# Patient Record
Sex: MALE | Race: ASIAN | URBAN - METROPOLITAN AREA
[De-identification: names, ages, dates, MRNs, and addresses within clinical notes are randomized per-mention and may not be internally consistent; named-entity substitution may affect disease eponyms.]

---
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
## 2023-02-01 ENCOUNTER — OFFICE VISIT (OUTPATIENT)
Dept: FAMILY MEDICINE CLINIC | Facility: CLINIC | Age: 33
End: 2023-02-01
Payer: COMMERCIAL

## 2023-02-01 VITALS
HEIGHT: 71.7 IN | DIASTOLIC BLOOD PRESSURE: 80 MMHG | OXYGEN SATURATION: 99 % | TEMPERATURE: 98 F | SYSTOLIC BLOOD PRESSURE: 126 MMHG | BODY MASS INDEX: 33.82 KG/M2 | HEART RATE: 82 BPM | WEIGHT: 247 LBS | RESPIRATION RATE: 18 BRPM

## 2023-02-01 DIAGNOSIS — N50.812 LEFT TESTICULAR PAIN: ICD-10-CM

## 2023-02-01 DIAGNOSIS — R22.31 LUMP OF SKIN OF RIGHT UPPER EXTREMITY: ICD-10-CM

## 2023-02-01 DIAGNOSIS — R19.02 LEFT UPPER QUADRANT ABDOMINAL MASS: ICD-10-CM

## 2023-02-01 DIAGNOSIS — E55.9 VITAMIN D DEFICIENCY: ICD-10-CM

## 2023-02-01 DIAGNOSIS — R31.9 HEMATURIA, UNSPECIFIED TYPE: ICD-10-CM

## 2023-02-01 DIAGNOSIS — Z13.6 SCREENING FOR CARDIOVASCULAR CONDITION: ICD-10-CM

## 2023-02-01 DIAGNOSIS — Z00.00 ANNUAL PHYSICAL EXAM: Primary | ICD-10-CM

## 2023-02-01 PROCEDURE — 3079F DIAST BP 80-89 MM HG: CPT | Performed by: FAMILY MEDICINE

## 2023-02-01 PROCEDURE — 99385 PREV VISIT NEW AGE 18-39: CPT | Performed by: FAMILY MEDICINE

## 2023-02-01 PROCEDURE — 3074F SYST BP LT 130 MM HG: CPT | Performed by: FAMILY MEDICINE

## 2023-02-01 PROCEDURE — 3008F BODY MASS INDEX DOCD: CPT | Performed by: FAMILY MEDICINE

## 2023-02-03 LAB
ALBUMIN/GLOBULIN RATIO: 1.6 (CALC) (ref 1–2.5)
ALBUMIN: 4.5 G/DL (ref 3.6–5.1)
ALKALINE PHOSPHATASE: 68 U/L (ref 36–130)
ALT: 36 U/L (ref 9–46)
APPEARANCE: CLEAR
AST: 28 U/L (ref 10–40)
BILIRUBIN, TOTAL: 0.4 MG/DL (ref 0.2–1.2)
BILIRUBIN: NEGATIVE
BUN: 18 MG/DL (ref 7–25)
CALCIUM: 9.8 MG/DL (ref 8.6–10.3)
CARBON DIOXIDE: 28 MMOL/L (ref 20–32)
CHLORIDE: 104 MMOL/L (ref 98–110)
CHOL/HDLC RATIO: 5 (CALC)
CHOLESTEROL, TOTAL: 198 MG/DL
COLOR: YELLOW
CREATININE: 0.89 MG/DL (ref 0.6–1.26)
EGFR: 117 ML/MIN/1.73M2
GLOBULIN: 2.8 G/DL (CALC) (ref 1.9–3.7)
GLUCOSE: 100 MG/DL (ref 65–99)
GLUCOSE: NEGATIVE
HDL CHOLESTEROL: 40 MG/DL
HEMATOCRIT: 42.8 % (ref 38.5–50)
HEMOGLOBIN: 15 G/DL (ref 13.2–17.1)
KETONES: NEGATIVE
LDL-CHOLESTEROL: 130 MG/DL (CALC)
LEUKOCYTE ESTERASE: NEGATIVE
MCH: 27.8 PG (ref 27–33)
MCHC: 35 G/DL (ref 32–36)
MCV: 79.3 FL (ref 80–100)
MPV: 12.1 FL (ref 7.5–12.5)
NITRITE: NEGATIVE
NON-HDL CHOLESTEROL: 158 MG/DL (CALC)
OCCULT BLOOD: NEGATIVE
PH: 6 (ref 5–8)
PLATELET COUNT: 232 THOUSAND/UL (ref 140–400)
POTASSIUM: 4.8 MMOL/L (ref 3.5–5.3)
PROTEIN, TOTAL: 7.3 G/DL (ref 6.1–8.1)
PROTEIN: NEGATIVE
RDW: 13.6 % (ref 11–15)
RED BLOOD CELL COUNT: 5.4 MILLION/UL (ref 4.2–5.8)
SODIUM: 139 MMOL/L (ref 135–146)
SPECIFIC GRAVITY: 1.02 (ref 1–1.03)
T4, FREE: 1.1 NG/DL (ref 0.8–1.8)
TRIGLYCERIDES: 163 MG/DL
TSH W/REFLEX TO FT4: 6.03 MIU/L (ref 0.4–4.5)
VITAMIN D, 25-OH, TOTAL: 24 NG/ML (ref 30–100)
WHITE BLOOD CELL COUNT: 6.8 THOUSAND/UL (ref 3.8–10.8)

## 2023-02-06 ENCOUNTER — TELEPHONE (OUTPATIENT)
Dept: FAMILY MEDICINE CLINIC | Facility: CLINIC | Age: 33
End: 2023-02-06

## 2023-02-06 DIAGNOSIS — E55.9 VITAMIN D DEFICIENCY: Primary | ICD-10-CM

## 2023-02-06 DIAGNOSIS — R79.89 HIGH SERUM THYROID STIMULATING HORMONE (TSH): ICD-10-CM

## 2023-02-06 RX ORDER — ERGOCALCIFEROL 1.25 MG/1
50000 CAPSULE ORAL WEEKLY
Qty: 12 CAPSULE | Refills: 0 | Status: SHIPPED | OUTPATIENT
Start: 2023-02-06 | End: 2023-04-25

## 2023-02-06 NOTE — TELEPHONE ENCOUNTER
----- Message from Sybil Rogers MD sent at 2/6/2023  5:05 PM CST -----  Results reviewed. Please inform patient labs overall looks good. His TSH mild elevated but T4 is normal.  We will repeat a TSH in 4 weeks if it still elevated then consider starting medication for thyroid.   Vitamin D is low I did order medication for 3 months and then he can take over-the-counter vitamin D 5000 international units every day high cholesterol  Recommend low-fat diet and exercise  Urine test is normal.

## 2023-02-09 ENCOUNTER — HOSPITAL ENCOUNTER (OUTPATIENT)
Dept: ULTRASOUND IMAGING | Age: 33
Discharge: HOME | End: 2023-02-09
Attending: FAMILY MEDICINE
Payer: COMMERCIAL

## 2023-02-09 DIAGNOSIS — N50.812 LEFT TESTICULAR PAIN: ICD-10-CM

## 2023-02-09 PROCEDURE — 76870 US EXAM SCROTUM: CPT | Performed by: FAMILY MEDICINE

## 2023-02-09 PROCEDURE — 93975 VASCULAR STUDY: CPT | Performed by: FAMILY MEDICINE

## 2023-02-14 ENCOUNTER — TELEPHONE (OUTPATIENT)
Dept: FAMILY MEDICINE CLINIC | Facility: CLINIC | Age: 33
End: 2023-02-14

## 2023-02-14 NOTE — TELEPHONE ENCOUNTER
----- Message from Junior Esperanza MD sent at 2/14/2023 12:09 PM CST -----  Results reviewed. Please inform patient Us testicular area shows no mass or any abnormality.   If the symptoms continues call office and we will have him see urologist.

## 2023-03-06 ENCOUNTER — TELEPHONE (OUTPATIENT)
Dept: FAMILY MEDICINE CLINIC | Facility: CLINIC | Age: 33
End: 2023-03-06

## 2023-03-09 ENCOUNTER — TELEPHONE (OUTPATIENT)
Dept: FAMILY MEDICINE CLINIC | Facility: CLINIC | Age: 33
End: 2023-03-09

## 2023-03-26 ENCOUNTER — HOSPITAL ENCOUNTER (OUTPATIENT)
Age: 33
Discharge: HOME | End: 2023-03-26
Payer: COMMERCIAL

## 2023-03-26 VITALS
RESPIRATION RATE: 18 BRPM | TEMPERATURE: 98 F | SYSTOLIC BLOOD PRESSURE: 151 MMHG | DIASTOLIC BLOOD PRESSURE: 104 MMHG | OXYGEN SATURATION: 98 % | HEART RATE: 94 BPM

## 2023-03-26 DIAGNOSIS — J02.0 STREPTOCOCCAL SORE THROAT: Primary | ICD-10-CM

## 2023-03-26 LAB — S PYO AG THROAT QL: POSITIVE

## 2023-03-26 PROCEDURE — 87880 STREP A ASSAY W/OPTIC: CPT | Performed by: NURSE PRACTITIONER

## 2023-03-26 PROCEDURE — 99203 OFFICE O/P NEW LOW 30 MIN: CPT | Performed by: NURSE PRACTITIONER

## 2023-03-26 RX ORDER — DEXAMETHASONE SODIUM PHOSPHATE 4 MG/ML
12 INJECTION, SOLUTION INTRA-ARTICULAR; INTRALESIONAL; INTRAMUSCULAR; INTRAVENOUS; SOFT TISSUE ONCE
Status: COMPLETED | OUTPATIENT
Start: 2023-03-26 | End: 2023-03-26

## 2023-03-26 RX ORDER — PENICILLIN V POTASSIUM 500 MG/1
500 TABLET ORAL 3 TIMES DAILY
Qty: 30 TABLET | Refills: 0 | Status: SHIPPED | OUTPATIENT
Start: 2023-03-26 | End: 2023-04-05

## 2023-04-14 ENCOUNTER — PATIENT MESSAGE (OUTPATIENT)
Dept: FAMILY MEDICINE CLINIC | Facility: CLINIC | Age: 33
End: 2023-04-14

## 2023-04-14 DIAGNOSIS — E55.9 VITAMIN D DEFICIENCY: ICD-10-CM

## 2023-04-14 RX ORDER — ERGOCALCIFEROL 1.25 MG/1
50000 CAPSULE ORAL WEEKLY
Qty: 12 CAPSULE | Refills: 0 | OUTPATIENT
Start: 2023-04-14 | End: 2023-07-01

## 2023-04-14 NOTE — TELEPHONE ENCOUNTER
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 Lucretia Upton:    [facility] you are doing well. I have a question for you. So I'd be attending the [address] [place] [address] starting this fall and they have some immunization records. I know for sure that I have had these immunizations in the past (except the TDap in the past 10 years) but I am not able to find them. I will still look for them but meanwhile, I wanted to ask you:    1. Is it possible for me to still get these immunizations between now and say, August when the classes start? 2. They offer an alternative for certificate of immunity - I don't know what that means but is that something I can have from your office? Meanwhile, I will look for the old vaccination records and see if I can find them.      Thanks,    Deanne Layne

## 2023-05-15 ENCOUNTER — NURSE ONLY (OUTPATIENT)
Dept: FAMILY MEDICINE CLINIC | Facility: CLINIC | Age: 33
End: 2023-05-15
Payer: COMMERCIAL

## 2023-05-15 DIAGNOSIS — Z23 NEED FOR VACCINATION: Primary | ICD-10-CM

## 2023-05-15 DIAGNOSIS — Z02.9 ADMINISTRATIVE ENCOUNTER: ICD-10-CM

## 2023-05-15 PROCEDURE — 90471 IMMUNIZATION ADMIN: CPT | Performed by: FAMILY MEDICINE

## 2023-05-15 PROCEDURE — 90715 TDAP VACCINE 7 YRS/> IM: CPT | Performed by: FAMILY MEDICINE

## 2023-05-16 ENCOUNTER — PATIENT MESSAGE (OUTPATIENT)
Dept: FAMILY MEDICINE CLINIC | Facility: CLINIC | Age: 33
End: 2023-05-16

## 2023-07-20 ENCOUNTER — PATIENT MESSAGE (OUTPATIENT)
Dept: FAMILY MEDICINE CLINIC | Facility: CLINIC | Age: 33
End: 2023-07-20

## 2023-07-21 NOTE — TELEPHONE ENCOUNTER
From: Kike Garcias  To: Kelly Shukla MD  Sent: 7/20/2023 7:07 PM CDT  Subject: Immunization Form for Bridgette Celis Dr. Rashid Alicia you are doing well. 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 One of the requirements for the incoming students is to fill out the immunization form (attached here). I have put my details in the form. Would you be able to see if someone at your office can help me fill the dates for the vaccinations and stamp/sign as necessary as per my record? Lastly, one of the requirements is also to do a Quantiferon TB Blood Test and include the result in the form. I did this a few days ago before leaving [facility] and the report is attached here. This report is also available here on the provider's site: [email or web address]     If I need to submit in person, please let me know and I can come and drop this form off at your office. Thank you so much and hope you are doing well.     Sincerely,    Jerel

## 2023-07-22 ENCOUNTER — PATIENT MESSAGE (OUTPATIENT)
Dept: FAMILY MEDICINE CLINIC | Facility: CLINIC | Age: 33
End: 2023-07-22

## 2023-07-22 DIAGNOSIS — N50.812 LEFT TESTICULAR PAIN: Primary | ICD-10-CM

## 2023-07-22 NOTE — TELEPHONE ENCOUNTER
From: Aston Leslie  To: Jean-Paul Lloyd MD  Sent: 7/22/2023 11:18 AM CDT  Subject: Two Questions    Hi Dr. Binh Poon you are doing well. So I wanted to ask two questions. 1. I realized that I did not do my repeat thyroid tests in March. Would you be able to send a request to [address] [place] [address]) so I can get the tests done? 2. I still have occasional pain in the testes (on the left side). However, what I have noticed these days that there is accompanying pain on the penis as well (left side). Do I need to do more tests or see a specialist? Or should I just wait and see if the situation improves?     Thanks,    [place] Cease

## 2023-08-25 ENCOUNTER — OFFICE VISIT (OUTPATIENT)
Dept: SURGERY | Facility: CLINIC | Age: 33
End: 2023-08-25

## 2023-08-25 DIAGNOSIS — N45.1 EPIDIDYMITIS: Primary | ICD-10-CM

## 2023-08-25 DIAGNOSIS — R82.90 URINE FINDING: ICD-10-CM

## 2023-08-25 LAB
APPEARANCE: CLEAR
BILIRUBIN: NEGATIVE
GLUCOSE (URINE DIPSTICK): NEGATIVE MG/DL
KETONES (URINE DIPSTICK): NEGATIVE MG/DL
LEUKOCYTES: NEGATIVE
MULTISTIX LOT#: NORMAL NUMERIC
NITRITE, URINE: NEGATIVE
OCCULT BLOOD: NEGATIVE
PH, URINE: 5.5 (ref 4.5–8)
PROTEIN (URINE DIPSTICK): NEGATIVE MG/DL
SPECIFIC GRAVITY: 1.02 (ref 1–1.03)
URINE-COLOR: YELLOW
UROBILINOGEN,SEMI-QN: 0.2 MG/DL (ref 0–1.9)

## 2023-08-25 PROCEDURE — 99203 OFFICE O/P NEW LOW 30 MIN: CPT | Performed by: PHYSICIAN ASSISTANT

## 2023-08-25 PROCEDURE — 81002 URINALYSIS NONAUTO W/O SCOPE: CPT | Performed by: PHYSICIAN ASSISTANT

## 2023-08-25 NOTE — PATIENT INSTRUCTIONS
Epididymitis and Testicular Pain  The epididymis is a small tube next to the testicle that stores sperm. Inflammation of the epididymis can cause pain and swelling in your scrotum. The condition may be acute (sudden onset) or chronic (persisting for a longer period of time or recurrent). - Acute epididymitis is typically characterized by sudden onset pain, tenderness, swelling and redness.  - Chronic epididymitis is typically characterized by intermittent or long-term dull ache in one or both testicles but the pain can become severe at times. Causes  - Acute epididymitis is often caused by an infection. In sexually active men, it is often caused by a sexually transmitted disease (STD) such as chlamydia or gonorrhea. In boys and in men over 36, it can be from bacteria from other parts of the urinary tract (not an STD infection). It may also be caused by trauma. - Chronic epididymitis often may not be associated with an infectious process. Things that may irritate the testicles include sitting for long periods of time, squats or dead-lifts, motorcycles or biking, inflammatory disorders of the pelvis and other painful conditions that can affect the pelvis such as chronic low back pain. Constipation and other bowel-related issues can contribute to chronic testicular pain. Symptoms may begin with pain in the lower belly (abdomen) or low back. The pain then spreads down into the scrotum. Usually only one side is affected. The testicle and scrotum swell and become very painful and red. You may have fever and a burning when passing urine. Sometimes you may have a discharge from the penis. Treatment is typically with antibiotics, and anti-inflammatory and pain medicines. The condition should get better over the first few days of treatment. But it will take several weeks for all the swelling and discomfort to go away.  If your healthcare provider suspects that an STD is the cause, your sexual partners may need to be treated. Home care  The following will help you care for yourself at home:  Support the scrotum. When lying down, place a rolled towel under the scrotum. When walking, use an athletic supporter or 2 pairs of jockey-style underwear. Rest at home until the fever is gone and you are feeling better. If your healthcare gives you an antibiotic, take it exactly as you are told. Take it until it is all gone. Avoid sitting at a 90 degree angle for long periods of time. Standing or reclining is preferable. Avoid sitting on anything that shakes (tractors, lawn-mowers, long car rides) if possible. You may use a donut while sitting to avoid excessive pressure on the testicles while sitting. To relieve pain, put ice packs on the inflamed area. You can make your own ice pack by putting ice cubes in a sealed plastic bag wrapped in a thin towel. Soaking in a hot bath or using a heating pad may help alleviated discomfort in men suffering from chronic epididymitis. Would avoid excessive heat in the setting of acute epididymitis. You may use over-the-counter medicines to control pain, unless another medicine was given. Ibuprofen is typically a very effective anti-inflammatory pain medication. You may take 400 mg twice daily with plenty of water as needed for discomfort. If you have chronic liver or kidney disease, talk with your healthcare provider before taking these medicines. Also talk with your provider if you've ever had a stomach ulcer or GI bleeding. Make sure chronic pain issues are under good control, especially back pain issues as this is a significant risk factor for chronic testicular/pelvic pain. Talk to your primary care or back specialist if your pain is not under adequate control. Constipation can make you strain. This makes the pain worse. Avoid constipation by eating natural laxatives such as prunes, fresh fruits, and whole-grain cereals.  If necessary, use a mild over-the-counter laxative such as colace for constipation. Mineral oil can be used to keep the stools soft.

## 2023-08-25 NOTE — PROGRESS NOTES
[facility], [address] [facility]    Urology Consult Note    History of Present Illness:   Patient is a 33year old healthy male who presents today for consultation from Dr. Brannon Ramirez office for intermittent left testicular pain. He has been seen by his PCP for this back in February 2023 for same pain that has been present for >1 year. Ultrasound was completed in February and normal, without evidence of any acute abnormality. Onset: >1 year, Laterality: left, Severity: low, takes ibuprofen prn. Exacerbating/Alleviating factors: none in particular, but upon further conversation has had some increased after long trips that involved sitting etc.   Pulsating intermittent sharp pain that can last for 12-24 hours - able to go about normal activities. Seems that he has been noticing on the left side of his penis. Occupation:  and recently and sits for prolonged hours during the day. He denies any use biking or heavy  Stress: high stress with balancing everything, has been ongoing for >2 years. Back pain: not currently, but has historically had low back pain. He denies f/c, n/v, abd/flank pain. Sleeps from 2-6am    No significant voiding complaints. No incontinence. No dysuria, gross hematuria. UA is negative. No concern or hx of STI. Tobacco hx: none  Kidney stone hx: none  Fam h/o  malignancy: none    Had been told that when he was seen in [place] for [place] pain. UA showed trace blood, culture negative. HISTORY:  No past medical history on file. No past surgical history on file. No family history on file.    Social History:   Social History     Socioeconomic History    Marital status:    Tobacco Use    Smoking status: Never    Smokeless tobacco: Never    Tobacco comments:     NEVER    Vaping Use    Vaping Use: Never used   Substance and Sexual Activity    Alcohol use: Not Currently    Drug use: Never        Allergies  No Known Allergies    Review of Systems:   A 10-point review of systems was completed and is negative other than as noted above. Physical Exam:   There were no vitals taken for this visit. GENERAL APPEARANCE: well developed, well nourished, in no acute distress  NEUROLOGIC: no localizing neurologic signs, alert and oriented x 3, converses appropriately  HEAD: atraumatic, normocephalic  EYES: sclera non-icteric  ORAL CAVITY: mucosa moist  NECK/THYROID: no obvious masses or goiter  LUNGS: non-labored breathing  ABDOMEN: soft, nontender, nondistended  CVA: no CVA tenderness  INGUINAL CANALS: no hernias  PENILE MEATUS: open and in normal location  PENIS normal  SCROTUM: normal  no varicocele  TESTES: normal anatomy left testicle high riding within scrotum but able to be pulled easily into inferior scrotum  EPIDIDYMIS: normal anatomy  EXTREMITIES: warm, well-perfused. No clubbing, cyanosis or edema. SKIN: no obvious rashes    Results:     Laboratory Data:  Lab Results   Component Value Date    WBC 6.8 02/02/2023    HGB 15.0 02/02/2023     02/02/2023     Lab Results   Component Value Date     02/02/2023    K 4.8 02/02/2023     02/02/2023    CO2 28 02/02/2023    BUN 18 02/02/2023     (H) 02/02/2023    AST 28 02/02/2023    ALT 36 02/02/2023    TP 7.3 02/02/2023    ALB 4.5 02/02/2023    CA 9.8 02/02/2023       Urinalysis Results (last three years):  Recent Labs     02/02/23  0919 08/25/23  1149   COLORUR YELLOW  --    CLARITY CLEAR  --    SPECGRAVITY 1.021 1.025   PHURINE 6.0 5.5   PROUR NEGATIVE  --    GLUUR NEGATIVE  --    KETUR NEGATIVE  --    BILUR NEGATIVE  --    NITRITE NEGATIVE Negative   LEUUR NEGATIVE  --    WBCUR NONE SEEN  --    RBCUR NONE SEEN  --    BACUR NONE SEEN  --        Urine Culture Results (last three years):  No results found for: URINECUL    Imaging  No results found.       Impression:     Patient is a 33year old male who presents today [facility]. We discussed that he likely has a component of epididymitis. We reviewed epididymitis and testicular pain treatment and prevention strategies and I provided and reviewed educational materials for this. I recommend he drink plenty of fluids and try prn NSAIDs, wear an athletic supporter and try to avoid activities which exacerbate pain such as prolonged sitting or heavy lifting, try hot baths/hot packs. We also discussed PFT in the future if ongoing symptoms. Recommendations:  As above. Thank you very much for this consult. Please call if there are any questions or concerns.      Riddhi Wilkerson PA-C  Urology  [facility]    Date: 8/25/2023

## 2023-12-13 ENCOUNTER — PATIENT MESSAGE (OUTPATIENT)
Dept: FAMILY MEDICINE CLINIC | Facility: CLINIC | Age: 33
End: 2023-12-13

## 2023-12-14 NOTE — TELEPHONE ENCOUNTER
From: Haider Stuart  To: Cristela Sosa  Sent: 12/13/2023 8:45 PM CST  Subject: Updating Ivy Estrada Grandchild,    I wanted to update you about our new health insurance information. If you can pass this along to the relevant person to update the health insurance in the system that'd great. This is only for me and my wife Cherelle Hernandez; she is the dependent on the same insurance).     Best,    Ellie Garcias

## 2023-12-21 ENCOUNTER — PATIENT MESSAGE (OUTPATIENT)
Dept: FAMILY MEDICINE CLINIC | Facility: CLINIC | Age: 33
End: 2023-12-21

## 2023-12-21 NOTE — TELEPHONE ENCOUNTER
From: Rosi Alexandre  To: Gi[facility]pe [facility]  Sent: 12/21/2023 2:12 AM CST  Subject: Muscle Pain    Hi Dr. Asha Rodriguez,    So for the past few months, I have been going to gym. I do some strength training every day. For the last three weeks or so, I have been experiencing a sharp pain when I am walking or running in the front part of the leg muscles (above the knee). I was looking at the picture of the leg muscles and it seems like the pain is probably around the tensor fasciae latae or rectus femoris area. I have been managing well enough to walk but if I sit for an extended period of time or try running at a higher speed, I feel the sharp pain in the leg muscle. Because it has been three weeks and the pain hasn't subsided, I am writing to you. I don't exactly recall what triggered this but I do remember that I was doing some heavy backyard work (moving wheelbarrows of leaves and debris) and then I may have also done heavy leg exercises that week. Should I just wait and watch, and see if the situation improves in a week? Out of a scale of 1-10 for pain, it's probably 0-1 when walking but it escalates to   3-4 for light running and 7-8 for faster running.      Thanks,    Elizabeth Null

## 2023-12-21 NOTE — TELEPHONE ENCOUNTER
Recommend to take ibuprofen 400mg 3 times a day for 3 days. Stop exercise for few days and see if it improves. Also if any redness or swelling tingling numbness then do recommend to go to ER to make sure its not blood clot.

## 2024-03-28 ENCOUNTER — PATIENT MESSAGE (OUTPATIENT)
Dept: FAMILY MEDICINE CLINIC | Facility: CLINIC | Age: 34
End: 2024-03-28

## 2024-03-28 DIAGNOSIS — N50.89 TESTICULAR LUMP: Primary | ICD-10-CM

## 2024-03-29 NOTE — TELEPHONE ENCOUNTER
Referral on file for Dr. Ellis, but has .  New referral placed.  Contact information sent via [employer]

## 2024-03-29 NOTE — TELEPHONE ENCOUNTER
From: Pascual Field  To: Alanis Garduno  Sent: 3/28/2024 11:18 PM CDT  Subject: Lump on the left testes?    Hi Dr. Garduno,    A few weeks ago I felt that I have a small lump on my left testes. Today I felt it again. It's painless and it doesn't bother. It's probably the size of a small almond. It's a little hard to find at first but it's there on the left side of the left testes. Should I be watching for some other signs/symptoms or do I need to probably go for ultrasound?     Horacio,    Jerel

## 2024-09-11 ENCOUNTER — PATIENT MESSAGE (OUTPATIENT)
Dept: FAMILY MEDICINE CLINIC | Facility: CLINIC | Age: 34
End: 2024-09-11

## 2024-09-12 NOTE — TELEPHONE ENCOUNTER
Patient has an upcoming appointment scheduled.  Patient is due.  Okay to proceed with immunization paperwork?    Future Appointments   Date Time Provider Department Center   11/7/2024  9:00 AM [facility]

## 2024-09-12 NOTE — TELEPHONE ENCOUNTER
From: Pascual Field  To: Alanis Garduno  Sent: 9/11/2024 7:42 PM CDT  Subject: Immunization Record    Hi Dr. 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 I have now transferred to [employer], and they have similar requirements. Would you be able to please help me fill out this form attached here?    If you have any questions, please let me know. I or my wife can pick it up from the office over the next two days or early next week.     Thank you,    Jerel

## 2024-11-07 ENCOUNTER — PATIENT MESSAGE (OUTPATIENT)
Dept: FAMILY MEDICINE CLINIC | Facility: CLINIC | Age: 34
End: 2024-11-07

## 2024-11-09 ENCOUNTER — OFFICE VISIT (OUTPATIENT)
Dept: FAMILY MEDICINE CLINIC | Facility: CLINIC | Age: 34
End: 2024-11-09
Payer: COMMERCIAL

## 2024-11-09 VITALS
RESPIRATION RATE: 18 BRPM | DIASTOLIC BLOOD PRESSURE: 84 MMHG | OXYGEN SATURATION: 99 % | WEIGHT: 250 LBS | HEIGHT: 72 IN | TEMPERATURE: 98 F | SYSTOLIC BLOOD PRESSURE: 128 MMHG | HEART RATE: 105 BPM | BODY MASS INDEX: 33.86 KG/M2

## 2024-11-09 DIAGNOSIS — Z23 NEED FOR VACCINATION: ICD-10-CM

## 2024-11-09 DIAGNOSIS — Z00.00 ANNUAL PHYSICAL EXAM: Primary | ICD-10-CM

## 2024-11-09 DIAGNOSIS — E55.9 VITAMIN D DEFICIENCY: ICD-10-CM

## 2024-11-09 DIAGNOSIS — Z13.6 SCREENING FOR CARDIOVASCULAR CONDITION: ICD-10-CM

## 2024-11-09 DIAGNOSIS — B36.0 PITYRIASIS VERSICOLOR: ICD-10-CM

## 2024-11-09 DIAGNOSIS — R79.89 HIGH SERUM THYROID STIMULATING HORMONE (TSH): ICD-10-CM

## 2024-11-09 LAB
ALBUMIN SERPL-MCNC: 4.7 G/DL (ref 3.2–4.8)
ALBUMIN/GLOB SERPL: 1.3 {RATIO} (ref 1–2)
ALP LIVER SERPL-CCNC: 74 U/L
ALT SERPL-CCNC: 34 U/L
ANION GAP SERPL CALC-SCNC: 4 MMOL/L (ref 0–18)
AST SERPL-CCNC: 27 U/L (ref ?–34)
BILIRUB SERPL-MCNC: 0.5 MG/DL (ref 0.3–1.2)
BUN BLD-MCNC: 20 MG/DL (ref 9–23)
CALCIUM BLD-MCNC: 10 MG/DL (ref 8.7–10.4)
CHLORIDE SERPL-SCNC: 108 MMOL/L (ref 98–112)
CHOLEST SERPL-MCNC: 212 MG/DL (ref ?–200)
CO2 SERPL-SCNC: 28 MMOL/L (ref 21–32)
CREAT BLD-MCNC: 0.99 MG/DL
EGFRCR SERPLBLD CKD-EPI 2021: 103 ML/MIN/1.73M2 (ref 60–?)
ERYTHROCYTE [DISTWIDTH] IN BLOOD BY AUTOMATED COUNT: 13.4 %
FASTING PATIENT LIPID ANSWER: YES
FASTING STATUS PATIENT QL REPORTED: YES
GLOBULIN PLAS-MCNC: 3.5 G/DL (ref 2–3.5)
GLUCOSE BLD-MCNC: 113 MG/DL (ref 70–99)
HCT VFR BLD AUTO: 45.5 %
HDLC SERPL-MCNC: 42 MG/DL (ref 40–59)
HGB BLD-MCNC: 16.6 G/DL
LDLC SERPL CALC-MCNC: 145 MG/DL (ref ?–100)
MCH RBC QN AUTO: 27.9 PG (ref 26–34)
MCHC RBC AUTO-ENTMCNC: 36.5 G/DL (ref 31–37)
MCV RBC AUTO: 76.6 FL
NONHDLC SERPL-MCNC: 170 MG/DL (ref ?–130)
OSMOLALITY SERPL CALC.SUM OF ELEC: 293 MOSM/KG (ref 275–295)
PLATELET # BLD AUTO: 302 10(3)UL (ref 150–450)
POTASSIUM SERPL-SCNC: 4.4 MMOL/L (ref 3.5–5.1)
PROT SERPL-MCNC: 8.2 G/DL (ref 5.7–8.2)
RBC # BLD AUTO: 5.94 X10(6)UL
SODIUM SERPL-SCNC: 140 MMOL/L (ref 136–145)
T4 FREE SERPL-MCNC: 1.3 NG/DL (ref 0.8–1.7)
TRIGL SERPL-MCNC: 139 MG/DL (ref 30–149)
TSI SER-ACNC: 6.14 UIU/ML (ref 0.55–4.78)
VIT D+METAB SERPL-MCNC: 25.4 NG/ML (ref 30–100)
VLDLC SERPL CALC-MCNC: 26 MG/DL (ref 0–30)
WBC # BLD AUTO: 9.2 X10(3) UL (ref 4–11)

## 2024-11-09 PROCEDURE — 84439 ASSAY OF FREE THYROXINE: CPT | Performed by: FAMILY MEDICINE

## 2024-11-09 PROCEDURE — 80053 COMPREHEN METABOLIC PANEL: CPT | Performed by: FAMILY MEDICINE

## 2024-11-09 PROCEDURE — 80061 LIPID PANEL: CPT | Performed by: FAMILY MEDICINE

## 2024-11-09 PROCEDURE — 90471 IMMUNIZATION ADMIN: CPT | Performed by: FAMILY MEDICINE

## 2024-11-09 PROCEDURE — 99395 PREV VISIT EST AGE 18-39: CPT | Performed by: FAMILY MEDICINE

## 2024-11-09 PROCEDURE — 85027 COMPLETE CBC AUTOMATED: CPT | Performed by: FAMILY MEDICINE

## 2024-11-09 PROCEDURE — 3008F BODY MASS INDEX DOCD: CPT | Performed by: FAMILY MEDICINE

## 2024-11-09 PROCEDURE — 3074F SYST BP LT 130 MM HG: CPT | Performed by: FAMILY MEDICINE

## 2024-11-09 PROCEDURE — 82306 VITAMIN D 25 HYDROXY: CPT | Performed by: FAMILY MEDICINE

## 2024-11-09 PROCEDURE — 84443 ASSAY THYROID STIM HORMONE: CPT | Performed by: FAMILY MEDICINE

## 2024-11-09 PROCEDURE — 3079F DIAST BP 80-89 MM HG: CPT | Performed by: FAMILY MEDICINE

## 2024-11-09 PROCEDURE — 90656 IIV3 VACC NO PRSV 0.5 ML IM: CPT | Performed by: FAMILY MEDICINE

## 2024-11-09 RX ORDER — CLOTRIMAZOLE AND BETAMETHASONE DIPROPIONATE 10; .64 MG/G; MG/G
1 CREAM TOPICAL 2 TIMES DAILY PRN
Qty: 45 G | Refills: 1 | Status: SHIPPED | OUTPATIENT
Start: 2024-11-09

## 2024-11-09 NOTE — PROGRESS NOTES
Pascual Field is a 34 year old male who presents for a complete physical exam.   HPI:   No concerns today.  Finger issue- symptoms started in July started with left thumb nad then few wks started in other finger. He has some cuts coming back. It sometimes bleed. He used vaseline and steroid and hydration. He is taking vitamin. He also notice some black spot on his abdomen.       Immunization History   Administered Date(s) Administered    Covid-19 Vaccine Pfizer 30 mcg/0.3 ml 05/05/2021, 11/02/2021    DTAP 11/06/1991, 12/11/1991, 01/15/1992, 04/16/1993    FLU VAC QIV SPLIT 3 YRS AND OLDER (39430) 10/09/2018    FLUZONE 6 months and older PFS 0.5 ml (79288) 03/23/2023    HEP B 09/26/1998, 10/26/1998, 11/26/1998    IPV 11/06/1991, 12/11/1991, 01/15/1992, 02/14/1992, 03/20/1992    Influenza 09/30/2020    Influenza Vaccine, trivalent (IIV3), PF 0.5mL (63983) 11/09/2024    MMR 06/17/1992, 05/08/1996, 08/14/2009    Meningococcal-Menactra 08/14/2009    TDAP 05/15/2023    Varicella Deferred (Had Chicken Pox) 05/20/2009     Wt Readings from Last 6 Encounters:   11/09/24 250 lb (113.4 kg)   02/01/23 247 lb (112 kg)     Body mass index is 33.91 kg/m².     Lab Results   Component Value Date     (H) 11/09/2024     (H) 02/02/2023     Lab Results   Component Value Date    CHOLEST 212 (H) 11/09/2024    CHOLEST 198 02/02/2023     Lab Results   Component Value Date    HDL 42 11/09/2024    HDL 40 02/02/2023     Lab Results   Component Value Date     (H) 11/09/2024     (H) 02/02/2023     Lab Results   Component Value Date    AST 27 11/09/2024    AST 28 02/02/2023     Lab Results   Component Value Date    ALT 34 11/09/2024    ALT 36 02/02/2023     No results found for: \"PSA\"     Current Outpatient Medications   Medication Sig Dispense Refill    clotrimazole-betamethasone 1-0.05 % External Cream Apply 1 Application topically 2 (two) times daily as needed. Apply over abdomen area and finger area 2 times a  day for 2 wks. 45 g 1      History reviewed. No pertinent past medical history.   History reviewed. No pertinent surgical history.   History reviewed. No pertinent family history.   Social History:  Social History     Socioeconomic History    Marital status:    Tobacco Use    Smoking status: Never    Smokeless tobacco: Never    Tobacco comments:     NEVER    Vaping Use    Vaping status: Never Used   Substance and Sexual Activity    Alcohol use: Not Currently    Drug use: Never        REVIEW OF SYSTEMS:   GENERAL: feels well   SKIN: denies any unusual skin lesions  EYES:denies blurred vision or double vision  HEENT: denies nasal congestion, sinus pain or ST  LUNGS: denies shortness of breath or cough with exertion  CARDIOVASCULAR: denies chest pain or palpitations  GI: denies abdominal pain. Denies heartburn. Has regular bowel movements. No blood in stool.  : denies nocturia or changes in stream. No hematuria.  MUSCULOSKELETAL: denies back pain  NEURO: denies headaches or dizziness  PSYCHE: denies depression or anxiety      EXAM:   /84   Pulse 105   Temp 97.5 °F (36.4 °C) (Temporal)   Resp 18   Ht 6' (1.829 m)   Wt 250 lb (113.4 kg)   SpO2 99%   BMI 33.91 kg/m²   Body mass index is 33.91 kg/m².   GENERAL: well nourished,in no apparent distress  SKIN: no rashes  HEENT: ears and throat are clear  EYES:PERRLA, EOMI, conjunctiva are clear  NECK: supple,no adenopathy,no bruits. No thyromegaly  BREAST: no dominant or suspicious mass  LUNGS: clear to auscultation  CARDIO: RRR without murmur  GI: soft, good BS's,no masses, HSM or tenderness  On his abdomen there are hyperpigmented brown patches  MUSCULOSKELETAL: back is not tender  Both finger and thumb dry crusty yellow skin. Very dry skin.   EXTREMITIES: no edema  NEURO: cranial nerves are intact,motor and sensory are grossly intact    ASSESSMENT AND PLAN:   Pascual Field is a 34 year old male who presents for a complete physical exam. Pt's  weight is Body mass index is 33.91 kg/m²., recommended low fat/carb diet and aerobic exercise 30-45 minutes 5 times weekly.   Fasting BW ordered: labs ordered  The patient indicates understanding of these issues and agrees to the plan.  Pityriasis versicolor on abdomen topical antifungal sent.  For finger can be irritated but also possible fungal component so cream sent. Derm referral given.   The patient is asked to return for CPX in 1 year.

## 2024-11-11 ENCOUNTER — PATIENT OUTREACH (OUTPATIENT)
Dept: FAMILY MEDICINE CLINIC | Facility: CLINIC | Age: 34
End: 2024-11-11

## 2024-11-21 ENCOUNTER — TELEMEDICINE (OUTPATIENT)
Dept: FAMILY MEDICINE CLINIC | Facility: CLINIC | Age: 34
End: 2024-11-21
Payer: COMMERCIAL

## 2024-11-21 DIAGNOSIS — R79.89 HIGH SERUM THYROID STIMULATING HORMONE (TSH): Primary | ICD-10-CM

## 2024-11-21 DIAGNOSIS — E78.49 OTHER HYPERLIPIDEMIA: ICD-10-CM

## 2024-11-21 NOTE — PROGRESS NOTES
No chief complaint on file.     Labs follow up  This visit is conducted using Telemedicine with live, interactive video and audio.    Patient has been referred to the [facility] website at [email or web address] to review the yearly Consent to Treat document.    Patient understands and accepts financial responsibility for any deductible, co-insurance and/or co-pays associated with this service.      HPI:    Patient ID: Pascual Field is a 34 year old male.    HPI Hyperlipidemia- no smoking hypertension or hyperlipidmia  F/h paternal gf heart disease.  High tsh he is using uniodised salt.     Review of Systems  Neg fatigue constipation hair loss.      Current Outpatient Medications   Medication Sig Dispense Refill    clotrimazole-betamethasone 1-0.05 % External Cream Apply 1 Application topically 2 (two) times daily as needed. Apply over abdomen area and finger area 2 times a day for 2 wks. 45 g 1     Allergies:Allergies[1]    HISTORY:  No past medical history on file.   No past surgical history on file.   No family history on file.   Social History:   Social History     Socioeconomic History    Marital status:    Tobacco Use    Smoking status: Never    Smokeless tobacco: Never    Tobacco comments:     NEVER    Vaping Use    Vaping status: Never Used   Substance and Sexual Activity    Alcohol use: Not Currently    Drug use: Never        PHYSICAL EXAM:    There were no vitals taken for this visit.   Physical Exam  Constitutional:       General: He is not in acute distress.     Appearance: He is not ill-appearing.   Skin:     Capillary Refill: Capillary refill takes less than 2 seconds.   Neurological:      Mental Status: He is alert.              ASSESSMENT/PLAN:   1. High serum thyroid stimulating hormone (TSH)  Repeat labs   - TSH W Reflex To Free T4; Future  - TSH W Reflex To Free T4    2. Other hyperlipidemia  Low fat diet and exercise.  Repeat labs  - Lipid Panel; Future  - Lipid Panel             No  follow-ups on file.          [1] No Known Allergies

## 2025-01-02 ENCOUNTER — TELEPHONE (OUTPATIENT)
Dept: FAMILY MEDICINE CLINIC | Facility: CLINIC | Age: 35
End: 2025-01-02

## 2025-01-02 DIAGNOSIS — B36.0 PITYRIASIS VERSICOLOR: Primary | ICD-10-CM

## 2025-01-02 NOTE — TELEPHONE ENCOUNTER
Patient is calling to see if we can change his referral for dermatology to [facility] in [place] to provider Durga Fraire.     He states that the referral that was given is no longer in network.   He has an appointment today with them at 1 pm. And they need a prior authorization in order to see him.     Fax number for the office is: [phone].    Patient requests a call back when it is sent.

## 2025-01-02 NOTE — TELEPHONE ENCOUNTER
Patient states this is a new provider that started in October 2024 so he may not be in the system yet  Patient with also be seeing Dr. Cheyanne Mariano  Referral placed and faxed   Statement Selected

## 2025-01-13 ENCOUNTER — PATIENT MESSAGE (OUTPATIENT)
Dept: FAMILY MEDICINE CLINIC | Facility: CLINIC | Age: 35
End: 2025-01-13

## 2025-01-13 DIAGNOSIS — B36.0 PITYRIASIS VERSICOLOR: Primary | ICD-10-CM

## 2025-01-23 ENCOUNTER — TELEPHONE (OUTPATIENT)
Dept: FAMILY MEDICINE CLINIC | Facility: CLINIC | Age: 35
End: 2025-01-23

## 2025-02-19 ENCOUNTER — APPOINTMENT (OUTPATIENT)
Dept: URBAN - METROPOLITAN AREA CLINIC 247 | Age: 35
Setting detail: DERMATOLOGY
End: 2025-02-19

## 2025-02-19 DIAGNOSIS — Q826 OTHER SPECIFIED ANOMALIES OF SKIN: ICD-10-CM

## 2025-02-19 DIAGNOSIS — L85.3 XEROSIS CUTIS: ICD-10-CM

## 2025-02-19 DIAGNOSIS — L28.0 LICHEN SIMPLEX CHRONICUS: ICD-10-CM

## 2025-02-19 DIAGNOSIS — Q819 OTHER SPECIFIED ANOMALIES OF SKIN: ICD-10-CM

## 2025-02-19 DIAGNOSIS — L20.89 OTHER ATOPIC DERMATITIS: ICD-10-CM

## 2025-02-19 DIAGNOSIS — Q828 OTHER SPECIFIED ANOMALIES OF SKIN: ICD-10-CM

## 2025-02-19 PROBLEM — L85.8 OTHER SPECIFIED EPIDERMAL THICKENING: Status: ACTIVE | Noted: 2025-02-19

## 2025-02-19 PROCEDURE — OTHER ADDITIONAL NOTES: OTHER

## 2025-02-19 PROCEDURE — OTHER OTC TREATMENT REGIMEN: OTHER

## 2025-02-19 PROCEDURE — OTHER PRESCRIPTION: OTHER

## 2025-02-19 PROCEDURE — 99204 OFFICE O/P NEW MOD 45 MIN: CPT

## 2025-02-19 PROCEDURE — OTHER MIPS QUALITY: OTHER

## 2025-02-19 PROCEDURE — OTHER COUNSELING: OTHER

## 2025-02-19 PROCEDURE — OTHER PRESCRIPTION MEDICATION MANAGEMENT: OTHER

## 2025-02-19 RX ORDER — CLOBETASOL PROPIONATE 0.5 MG/G
CREAM TOPICAL
Qty: 60 | Refills: 1 | Status: ERX | COMMUNITY
Start: 2025-02-19

## 2025-02-19 ASSESSMENT — LOCATION DETAILED DESCRIPTION DERM
LOCATION DETAILED: LEFT MIDDLE FINGERTIP
LOCATION DETAILED: LEFT MID DORSAL INDEX FINGER
LOCATION DETAILED: RIGHT RING FINGERNAIL
LOCATION DETAILED: RIGHT DISTAL DORSAL MIDDLE FINGER
LOCATION DETAILED: RIGHT MIDDLE FINGERNAIL
LOCATION DETAILED: RIGHT DISTAL DORSAL RING FINGER
LOCATION DETAILED: LEFT RING FINGERTIP
LOCATION DETAILED: LEFT INDEX FINGERNAIL
LOCATION DETAILED: LEFT MIDDLE FINGERNAIL
LOCATION DETAILED: LEFT POSTERIOR SHOULDER

## 2025-02-19 ASSESSMENT — LOCATION SIMPLE DESCRIPTION DERM
LOCATION SIMPLE: LEFT MIDDLE FINGER
LOCATION SIMPLE: RIGHT MIDDLE FINGER
LOCATION SIMPLE: RIGHT RING FINGER
LOCATION SIMPLE: LEFT RING FINGER
LOCATION SIMPLE: LEFT SHOULDER
LOCATION SIMPLE: LEFT INDEX FINGER

## 2025-02-19 ASSESSMENT — LOCATION ZONE DERM
LOCATION ZONE: ARM
LOCATION ZONE: FINGERNAIL
LOCATION ZONE: FINGER

## 2025-02-19 NOTE — PROCEDURE: OTC TREATMENT REGIMEN
Detail Level: Zone
Patient Specific Otc Recommendations (Will Not Stick From Patient To Patient): Eucerin roughness relief QPM

## 2025-02-19 NOTE — PROCEDURE: COUNSELING
Detail Level: Detailed
Patient Specific Counseling (Will Not Stick From Patient To Patient): Recommended OTC Eucerin roughness relief.
Patient Specific Counseling (Will Not Stick From Patient To Patient): Dry skin care sheet given to pt.

## 2025-02-19 NOTE — PROCEDURE: PRESCRIPTION MEDICATION MANAGEMENT
Detail Level: Zone
Initiate Treatment: Clobetasol cream apply to hands/fingertips BID for 1-2 week then 3 times a week PRN flare.
Render In Strict Bullet Format?: No
Samples Given: CeraVe cream

## 2025-02-19 NOTE — PROCEDURE: ADDITIONAL NOTES
Detail Level: Simple
Render Risk Assessment In Note?: no
Additional Notes: -Stressed the importance of moisturizing several times a day and after hand washing. \\n-Recommended gloves when doing the dishes.\\n-Pt works on a keyboard a lot during the day. Recommended not using any harsh cleaning products on the keyboard.\\n-Pt is not currently moisturizing at all, recommended CeraVe cream.
Additional Notes: -Stressed the importance of not picking/pulling of the skin.

## 2025-04-21 ENCOUNTER — PATIENT MESSAGE (OUTPATIENT)
Dept: FAMILY MEDICINE CLINIC | Facility: CLINIC | Age: 35
End: 2025-04-21

## 2025-04-21 DIAGNOSIS — B36.0 PITYRIASIS VERSICOLOR: Primary | ICD-10-CM

## 2025-04-21 NOTE — TELEPHONE ENCOUNTER
Referral placed for [facility]. Their phone number is [phone].  Fax: [phone]  Referral faxed